# Patient Record
Sex: MALE | Race: OTHER | HISPANIC OR LATINO | URBAN - METROPOLITAN AREA
[De-identification: names, ages, dates, MRNs, and addresses within clinical notes are randomized per-mention and may not be internally consistent; named-entity substitution may affect disease eponyms.]

---

## 2021-10-24 ENCOUNTER — EMERGENCY (EMERGENCY)
Facility: HOSPITAL | Age: 36
LOS: 1 days | Discharge: HOME | End: 2021-10-24
Attending: EMERGENCY MEDICINE
Payer: COMMERCIAL

## 2021-10-24 VITALS
WEIGHT: 306 LBS | SYSTOLIC BLOOD PRESSURE: 169 MMHG | DIASTOLIC BLOOD PRESSURE: 91 MMHG | TEMPERATURE: 98 F | HEIGHT: 73 IN | OXYGEN SATURATION: 98 % | RESPIRATION RATE: 20 BRPM | HEART RATE: 81 BPM

## 2021-10-24 PROCEDURE — 99283 EMERGENCY DEPT VISIT LOW MDM: CPT

## 2021-10-24 RX ORDER — IBUPROFEN 200 MG
600 TABLET ORAL ONCE
Refills: 0 | Status: COMPLETED | OUTPATIENT
Start: 2021-10-24 | End: 2021-10-24

## 2021-10-24 RX ORDER — ACETAMINOPHEN 500 MG
975 TABLET ORAL ONCE
Refills: 0 | Status: COMPLETED | OUTPATIENT
Start: 2021-10-24 | End: 2021-10-24

## 2021-10-24 RX ADMIN — Medication 600 MILLIGRAM(S): at 16:08

## 2021-10-24 RX ADMIN — Medication 975 MILLIGRAM(S): at 16:26

## 2021-10-24 RX ADMIN — Medication 600 MILLIGRAM(S): at 16:26

## 2021-10-24 RX ADMIN — Medication 975 MILLIGRAM(S): at 16:08

## 2021-10-24 NOTE — ED ADULT NURSE NOTE - OBJECTIVE STATEMENT
pt here for left sided facial pain.  he "feels like it is swollen" bu his [id number] is not swollen  took  no medication for pain.  pt has facial symmetry

## 2021-10-24 NOTE — ED PROVIDER NOTE - PHYSICAL EXAMINATION
NAD, VSS, Afebrile, + PERRL, EOMI, No temporal tender. Normal ears without tender. No facial swelling, eryth or lesions.    + Left sinus tender and left upper gum tender without obvious swelling or dental decay. Neck supple. Lungs clear. ABD soft, non tender. No CVA tender. Neuro- tender.

## 2021-10-24 NOTE — ED ADULT NURSE NOTE - NSIMPLEMENTINTERV_GEN_ALL_ED
Detail Level: Detailed
Quality 130: Documentation Of Current Medications In The Medical Record: Current Medications Documented
Quality 337: Tuberculosis Prevention For Psoriasis And Psoriatic Arthritis Patients On A Biological Immune Response Modifier: Patient has a documented negative annual TB screening.
Implemented All Universal Safety Interventions:  [place] to call system. Call bell, personal items and telephone within reach. Instruct patient to call for assistance. Room bathroom lighting operational. Non-slip footwear when patient is off stretcher. Physically safe environment: no spills, clutter or unnecessary equipment. Stretcher in lowest position, wheels locked, appropriate side rails in place.

## 2021-10-24 NOTE — ED PROVIDER NOTE - TOBACCO USE
0842- Discussed Hep B vaccine with POB.  POB  Would like infant to be given Hep B vaccine.  VIS given to POB and had no further questions. Hep B vaccine given to infant at bedside with POB present.     Never smoker

## 2021-10-24 NOTE — ED PROVIDER NOTE - OBJECTIVE STATEMENT
no PMHx  Marijuana 2-3times a day (last one was 2days ago)  Left facial pain, tearing with intermittent numbness for 24 hours.  no pain med.  cough/chest congestion during this week and resolved now.  No fever, chills, cough or congestion. No CP/SOB/ABD pain or N/V/D.  Denies weakness to extremities. 35yo male pt, no PMHx, Marijuana 2-3times a day (last one was 2days ago) presents to ED with left facial pain, tearing with intermittent numbness for 24 hours. Pt stated he had cough/chest congestion during past week, resolved now and noticed left sided facial pain since yesterday. Described pain as sharp pain with left upper gum pain with intermittent numbness. He also felt subjective fever with chills last night. Has not taken any pain med. Denies ear pain, temporal pain, ot sore throat. Denies visual changes or eye pain. Denies dizziness, neck or back pain. Denies CP/SOB/ABD pain or N/V/D. Denies weakness to extremities.

## 2021-10-24 NOTE — ED PROVIDER NOTE - NSFOLLOWUPINSTRUCTIONS_ED_ALL_ED_FT
Hydrate.    Take Ibuprofen or Tylenol for pain as package directed and respect warnings on the label.    Started Augmentin as directed if you had worsening symptoms or fever.     Follow up with your primary Dr. for reevaluation, call tomorrow for appointment.    Return for any concerns or worsening symptoms.

## 2021-10-24 NOTE — ED PROVIDER NOTE - ATTENDING CONTRIBUTION TO CARE
l face pressure / pain occ w vague numb feeling, no swelling. uri sxs  l face w ttp over max sinus and frontal  dw him treatment plan incl otc and if pain gets severe again or fevers- abx. return precautions. not cw cva/nerve inj/neuropathy

## 2021-10-24 NOTE — ED PROVIDER NOTE - PATIENT PORTAL LINK FT
You can access the FollowMyHealth Patient Portal offered by [facility] by registering at the following website: [email or web address]. By joining [employer]’s FollowMyHealth portal, you will also be able to view your health information using other applications (apps) compatible with our system.

## 2021-12-27 NOTE — ED ADULT TRIAGE NOTE - HEIGHT IN INCHES
Ayleen called today and stated she was told to schedule with cardiology by Dr Glass. She wants to see Dr Reynolds and he requests an order for a consult. Can you please enter a \"Service to Cardiology\" order? Please let me know once this has been completed. She is very anxious and is expecting my call to schedule. Thank you.   1
